# Patient Record
Sex: FEMALE | Race: WHITE
[De-identification: names, ages, dates, MRNs, and addresses within clinical notes are randomized per-mention and may not be internally consistent; named-entity substitution may affect disease eponyms.]

---

## 2023-08-11 ENCOUNTER — HOSPITAL ENCOUNTER (OUTPATIENT)
Dept: HOSPITAL 33 - ED | Age: 73
Setting detail: OBSERVATION
LOS: 1 days | Discharge: HOME | End: 2023-08-12
Attending: INTERNAL MEDICINE | Admitting: INTERNAL MEDICINE
Payer: MEDICARE

## 2023-08-11 DIAGNOSIS — E87.6: Primary | ICD-10-CM

## 2023-08-11 DIAGNOSIS — Z79.899: ICD-10-CM

## 2023-08-11 DIAGNOSIS — R53.1: ICD-10-CM

## 2023-08-11 DIAGNOSIS — I10: ICD-10-CM

## 2023-08-11 DIAGNOSIS — E78.5: ICD-10-CM

## 2023-08-11 DIAGNOSIS — Z20.828: ICD-10-CM

## 2023-08-11 LAB
ALBUMIN SERPL-MCNC: 4.3 G/DL (ref 3.5–5)
ALP SERPL-CCNC: 77 U/L (ref 38–126)
ALT SERPL-CCNC: 25 U/L (ref 0–35)
ANION GAP SERPL CALC-SCNC: 10.3 MEQ/L (ref 5–15)
AST SERPL QL: 37 U/L (ref 14–36)
BASOPHILS # BLD AUTO: 0.02 X10^3/UL (ref 0–0.4)
BASOPHILS NFR BLD AUTO: 0.3 % (ref 0–0.4)
BILIRUB BLD-MCNC: 0.8 MG/DL (ref 0.2–1.3)
BUN SERPL-MCNC: 19 MG/DL (ref 7–17)
CALCIUM SPEC-MCNC: 9.3 MG/DL (ref 8.4–10.2)
CHLORIDE SERPL-SCNC: 98 MMOL/L (ref 98–107)
CO2 SERPL-SCNC: 35 MMOL/L (ref 22–30)
CREAT SERPL-MCNC: 0.76 MG/DL (ref 0.52–1.04)
EOSINOPHIL # BLD AUTO: 0.23 X10^3/UL (ref 0–0.5)
GFR SERPLBLD BASED ON 1.73 SQ M-ARVRAT: > 60 ML/MIN
GLUCOSE SERPL-MCNC: 83 MG/DL (ref 74–106)
HCT VFR BLD AUTO: 40.2 % (ref 35–47)
HGB BLD-MCNC: 13.8 G/DL (ref 12–16)
IMM GRANULOCYTES # BLD: 0.02 X10^3U/L (ref 0–0.03)
IMM GRANULOCYTES NFR BLD: 0.3 % (ref 0–0.4)
LYMPHOCYTES # SPEC AUTO: 1.89 X10^3/UL (ref 1–4.6)
MCH RBC QN AUTO: 31.1 PG (ref 26–32)
MCHC RBC AUTO-ENTMCNC: 34.3 G/DL (ref 32–36)
MONOCYTES # BLD AUTO: 0.75 X10^3/UL (ref 0–1.3)
NRBC # BLD AUTO: 0 X10^3U/L (ref 0–0.01)
NRBC BLD AUTO-RTO: 0 % (ref 0–0.1)
PLATELET # BLD AUTO: 193 X10^3/UL (ref 150–450)
POTASSIUM SERPLBLD-SCNC: 2.6 MMOL/L (ref 3.5–5.1)
PROT SERPL-MCNC: 7.5 G/DL (ref 6.3–8.2)
RBC # BLD AUTO: 4.44 X10^6/UL (ref 4.1–5.4)
SODIUM SERPL-SCNC: 140 MMOL/L (ref 137–145)
WBC # BLD AUTO: 6.3 X10^3/UL (ref 4–10.5)

## 2023-08-11 PROCEDURE — 99284 EMERGENCY DEPT VISIT MOD MDM: CPT

## 2023-08-11 PROCEDURE — 85025 COMPLETE CBC W/AUTO DIFF WBC: CPT

## 2023-08-11 PROCEDURE — G0378 HOSPITAL OBSERVATION PER HR: HCPCS

## 2023-08-11 PROCEDURE — 84132 ASSAY OF SERUM POTASSIUM: CPT

## 2023-08-11 PROCEDURE — 36415 COLL VENOUS BLD VENIPUNCTURE: CPT

## 2023-08-11 PROCEDURE — 36000 PLACE NEEDLE IN VEIN: CPT

## 2023-08-11 PROCEDURE — 83735 ASSAY OF MAGNESIUM: CPT

## 2023-08-11 PROCEDURE — 80053 COMPREHEN METABOLIC PANEL: CPT

## 2023-08-11 PROCEDURE — 93268 ECG RECORD/REVIEW: CPT

## 2023-08-11 RX ADMIN — POTASSIUM CHLORIDE SCH MLS/HR: 14.9 INJECTION, SOLUTION INTRAVENOUS at 21:10

## 2023-08-11 RX ADMIN — POTASSIUM CHLORIDE SCH MLS/HR: 14.9 INJECTION, SOLUTION INTRAVENOUS at 23:20

## 2023-08-11 NOTE — ERPHSYRPT
- History of Present Illness


Time Seen by Provider: 08/11/23 20:59


Source: patient


Exam Limitations: no limitations


Patient Subjective Stated Complaint: Pt states "My dr had me get blood work 

today and my potassium was just over 2"


Triage Nursing Assessment: Pt presented alert and oriented X 3, skin pwd. Pt 

ambulates with an upright steady gait, able to speak in clear full sentences. PT

able to speka in clear full sentences. PT resting comfortalby on the bed.


Physician History: 


Patient is a 72-year-old female presents to our ED as a referral from her nurse 

practitioner for treatment of hypokalemia.  Patient is currently on a diuretic 

for blood pressure control.  Patient is not on a standard blood pressure medicat

ion due to allergy.  Patient states she feels weak.  No trauma.  No fever.  No 

nausea vomiting or diaphoresis.  Symptoms are constant.  Symptoms are moderate 

in intensity.  No specific worsening improving factors.  Patient voices no other

complaints or concerns at this time.








Portions of this note were created with voice recognition technology.  There may

be grammatical, spelling, punctuation or sound alike errors





Timing/Duration: today


Severity: moderate


Associated Symptoms: denies symptoms, weakness


Allergies/Adverse Reactions: 








diatrizoate sodium [From Hypaque] Allergy (Severe, Verified 08/11/23 18:23)


   anaphylaxis


doxepin Allergy (Severe, Verified 08/11/23 18:23)


   trouble breathing, headache


Penicillins Allergy (Severe, Verified 08/11/23 18:23)


   anaphylaxis


prochlorperazine [From Compazine] Allergy (Severe, Verified 08/11/23 18:23)


   anaphylaxis


pentobarbital [From Nembutal Sodium] Allergy (Unknown, Verified 08/11/23 18:23)


   


acrivastine [From Semprex-D] Adverse Reaction (Severe, Verified 08/11/23 18:23)


   chest pain


pseudoephedrine [From Semprex-D] Adverse Reaction (Severe, Verified 08/11/23 

18:23)


   chest pain


Bifidobacterium animalis [From Pamine FQ] Adverse Reaction (Intermediate, 

Verified 08/11/23 18:23)


   bloating and cramps


estrogens, conjugated [From Premarin] Adverse Reaction (Intermediate, Verified 

08/11/23 18:23)


   itching and pan


Lactobacillus acidophilus [From Pamine FQ] Adverse Reaction (Intermediate, 

Verified 08/11/23 18:23)


   bloating and cramps


Lactobacillus paracasei [From Pamine FQ] Adverse Reaction (Intermediate, 

Verified 08/11/23 18:23)


   bloating and cramps


meloxicam Adverse Reaction (Intermediate, Verified 08/11/23 18:23)


   cramping abdominal pain


nabumetone [From Relafen] Adverse Reaction (Intermediate, Verified 08/11/23 

18:23)


   esophageal and abdominal injury


omeprazole [From Prilosec] Adverse Reaction (Intermediate, Verified 08/11/23 

18:23)


   pain under left breast


scopolamine [From Pamine FQ] Adverse Reaction (Intermediate, Verified 08/11/23 

18:23)


   bloating and cramps


Streptococcus thermophilus [From Pamine FQ] Adverse Reaction (Intermediate, 

Verified 08/11/23 18:23)


   bloating and cramps


indomethacin [From Indocin] Adverse Reaction (Mild, Verified 08/11/23 18:23)


   nausea/vomiting


savilla Allergy (Intermediate, Uncoded 08/11/23 18:23)


   Hives


viactin Adverse Reaction (Severe, Uncoded 08/11/23 18:23)


   diarrhea/abdominal pain


shingles vaccine Adverse Reaction (Intermediate, Uncoded 08/11/23 18:23)


   headache, hot, itchy, pain, welts





Home Medications: 








Amitriptyline HCl 10 mg PO DAILY 08/11/23 [History]


Cyclobenzaprine HCl 10 mg*** [Cyclobenzaprine 10 MG***] 10 mg PO TID 08/11/23 

[History]


Diclofenac Sodium [Voltaren Arthritis Pain] 50 gm TP BID 08/11/23 [History]


Famotidine/Ca Carb/Mag Hydrox [Pepcid Complete Tablet Chew] 1 each PO DAILY 

08/11/23 [History]


Gabapentin 600 mg PO QID 08/11/23 [History]


Metronidazole Vag Gel*** [METROGEL Vag Gel 0.75%***] 70 gm VG DAILY PRN 08/11/23

[History]


Pantoprazole Sodium 40 mg PO DAILY 08/11/23 [History]


Polyethylene Glycol 3350 [Miralax] 17 gm PO DAILY 08/11/23 [History]


Zonisamide 50 mg PO BID 08/11/23 [History]


hydroCHLOROthiazide [Hydrochlorothiazide] 25 mg PO DAILY 08/11/23 [History]





Hx Tetanus, Diphtheria Vaccination/Date Given: No


Hx Influenza Vaccination/Date Given: Yes


Hx Pneumococcal Vaccination/Date Given: No


Immunizations Up to Date: No





Travel Risk





- International Travel


Have you traveled outside of the country in past 3 weeks: No





- Coronavirus Screening


Are you exhibiting any of the following symptoms?: No


Close contact with a COVID-19 positive Pt in past 14-21 Days: No





- Vaccine Status


Have you recieved a Covid-19 vaccination: Yes


: Moderna





- Vaccination Dates


Date of 2cond Vaccination (if applicable): 2021





- Review of Systems


Constitutional: No Symptoms, No Fever, No Chills


Eyes: No Symptoms


Ears, Nose, & Throat: No Symptoms


Respiratory: No Symptoms, No Cough, No Dyspnea


Cardiac: No Symptoms, No Chest Pain, No Edema, No Syncope


Abdominal/Gastrointestinal: No Symptoms, No Abdominal Pain, No Nausea, No 

Vomiting, No Diarrhea


Genitourinary Symptoms: No Symptoms, No Dysuria


Musculoskeletal: No Symptoms, No Back Pain, No Neck Pain


Skin: No Symptoms, No Rash


Neurological: No Symptoms, No Dizziness, No Focal Weakness, No Sensory Changes


Psychological: No Symptoms


Endocrine: No Symptoms


Hematologic/Lymphatic: No Symptoms


Immunological/Allergic: No Symptoms


All Other Systems: Reviewed and Negative





- Past Medical History


Pertinent Past Medical History: Yes


Neurological History: Migraines, Seizures, TIA


ENT History: Cataracts


Cardiac History: High Cholesterol, Hypertension


Respiratory History: No Pertinent History


Endocrine Medical History: Other


Musculoskeletal History: Degenerative Disk Disease, Fibromyalgia, Other


GI Medical History: Gallbladder Disease, Other


 History: Other


Psycho-Social History: No Pertinent History


Female Reproductive Disorders: Other


Other Medical History: renal cyst.  thyroid cyst.  bladder trouble.  endometrial

 hyperplasia.  bulging disc





- Past Surgical History


Past Surgical History: Yes


Cardiac: No Pertinent History


Respiratory: No Pertinent History


Gastrointestinal: Cholecystectomy


Genitourinary: No Pertinent History


Musculoskeletal: No Pertinent History


Female Surgical History: Tubal Ligation





- Social History


Smoking Status: Never smoker


Exposure to second hand smoke: No


Drug Use: none


Patient Lives Alone: No





- Nursing Vital Signs


Nursing Vital Signs: 





                               Initial Vital Signs











Temperature  97.2 F   08/11/23 18:03


 


Pulse Rate  96 H  08/11/23 18:03


 


Respiratory Rate  20   08/11/23 18:03


 


Blood Pressure  182/80   08/11/23 18:03


 


O2 Sat by Pulse Oximetry  95   08/11/23 18:03








                                   Pain Scale











Pain Intensity                 2

















- Physical Exam


General Appearance: no apparent distress, alert


Eye Exam: PERRL/EOMI, eyes nml inspection


Ears, Nose, Throat Exam: normal ENT inspection, TMs normal, pharynx normal, 

moist mucous membranes


Neck Exam: normal inspection, non-tender, supple, full range of motion


Respiratory Exam: normal breath sounds, lungs clear, airway intact, No 

respiratory distress


Cardiovascular Exam: regular rate/rhythm, normal heart sounds, normal peripheral

 pulses


Gastrointestinal/Abdomen Exam: soft, normal bowel sounds, No tenderness, No mass


Back Exam: normal inspection, normal range of motion, No CVA tenderness, No 

vertebral tenderness


Extremity Exam: normal inspection, normal range of motion, pelvis stable


Neurologic Exam: alert, oriented x 3, cooperative, normal mood/affect, nml 

cerebellar function, nml station & gait, sensation nml, No motor deficits


Skin Exam: normal color, warm, dry, No rash


Lymphatic Exam: No adenopathy


**SpO2 Interpretation**: normal


SpO2: 96


O2 Delivery: Room Air





- Course


Nursing assessment & vital signs reviewed: Yes


EKG Interpreted by Me: RATE ([id number]), Sinus Rhythm, NORMAL AXIS, NORMAL INTERVALS


Ordered Tests: 





                               Active Orders 24 hr











 Category Date Time Status


 


 Telemetry q4h Care  08/11/23 20:49 Active


 


 CBC W DIFF Stat Lab  08/11/23 20:20 Completed


 


 CMP Stat Lab  08/11/23 20:20 Completed


 


 MAGNESIUM Stat Lab  08/11/23 20:50 Received








Medication Summary











Generic Name Dose Route Start Last Admin





  Trade Name Freq  PRN Reason Stop Dose Admin


 


Magnesium Sulfate/Water  2 gm in 50 mls @ 100 mls/hr  08/11/23 21:00 





  Magnesium Sulf 2 G/50 Ml Bag  IV  08/12/23 21:29 





  Q30MIN EDILMA  


 


Potassium Chloride  20 meq in 100 mls @ 50 mls/hr  08/11/23 21:00 





  Potassium Chloride 20 Meq In Water 100ml  IV  08/12/23 00:59 





  Q2H EDILMA  














Discontinued Medications














Generic Name Dose Route Start Last Admin





  Trade Name Freq  PRN Reason Stop Dose Admin


 


Potassium Chloride  40 meq  08/11/23 20:49 





  Potassium Chloride Tab 10 Meq Tab  PO  08/11/23 20:50 





  STAT ONE  











Lab/Rad Data: 





                           Laboratory Result Diagrams





                                 08/11/23 20:20 





                                 08/11/23 20:20 





                               Laboratory Results











  08/11/23 08/11/23 Range/Units





  20:20 20:20 


 


WBC   6.3  (4.0-10.5)  x10^3/uL


 


RBC   4.44  (4.1-5.4)  x10^6/uL


 


Hgb   13.8  (12.0-16.0)  g/dL


 


Hct   40.2  (35-47)  %


 


MCV   90.5  ()  fL


 


MCH   31.1  (26-32)  pg


 


MCHC   34.3  (32-36)  g/dL


 


RDW   12.5  (11.5-14.0)  %


 


Plt Count   193  (150-450)  x10^3/uL


 


MPV   9.4  (7.5-11.0)  fL


 


Gran %   53.6  (36.0-66.0)  %


 


Immature Gran % (Auto)   0.3  (0.00-0.4)  %


 


Nucleat RBC Rel Count   0.0  (0.00-0.1)  %


 


Eos # (Auto)   0.23  (0-0.5)  x10^3/uL


 


Immature Gran # (Auto)   0.02  (0.00-0.03)  x10^3u/L


 


Absolute Lymphs (auto)   1.89  (1.0-4.6)  x10^3/uL


 


Absolute Monos (auto)   0.75  (0.0-1.3)  x10^3/uL


 


Absolute Nucleated RBC   0.00  (0.00-0.01)  x10^3u/L


 


Lymphocytes %   30.1  (24.0-44.0)  %


 


Monocytes %   12.0  (0.0-12.0)  %


 


Eosinophils %   3.7  (0.00-5.0)  %


 


Basophils %   0.3  (0.0-0.4)  %


 


Absolute Granulocytes   3.36  (1.4-6.9)  x10^3/uL


 


Basophils #   0.02  (0-0.4)  x10^3/uL


 


Sodium  140   (137-145)  mmol/L


 


Potassium  2.6 L*   (3.5-5.1)  mmol/L


 


Chloride  98   ()  mmol/L


 


Carbon Dioxide  35 H   (22-30)  mmol/L


 


Anion Gap  10.3   (5-15)  MEQ/L


 


BUN  19 H   (7-17)  mg/dL


 


Creatinine  0.76   (0.52-1.04)  mg/dL


 


Estimated GFR  > 60.0   ML/MIN


 


Glucose  83   ()  mg/dL


 


Calcium  9.3   (8.4-10.2)  mg/dL


 


Total Bilirubin  0.80   (0.2-1.3)  mg/dL


 


AST  37 H   (14-36)  U/L


 


ALT  25   (0-35)  U/L


 


Alkaline Phosphatase  77   ()  U/L


 


Serum Total Protein  7.5   (6.3-8.2)  g/dL


 


Albumin  4.3   (3.5-5.0)  g/dL














- Progress


Progress: improved


Progress Note: 


Patient 72-year-old female presents emergency department for as a referral for 

treatment for hypokalemia.  Potassium currently 2.6.  Patient states she has 

been feeling weak.  Physical exam reveals a well-appearing female.  No distress.

   at bedside.  Patient will be admitted for potassium placement.  Orders

 placed for 40 mill equivalent potassium chloride, 40 mEq K rider and 2 g 

magnesium.  Plan of care discussed with patient.  She agrees to admission to 

[facility] for further evaluation and treatment.








Portions of this note were created with voice recognition technology.  There may

 be grammatical, spelling, punctuation or sound alike errors








Complexity of problem addressed is moderate.





COPA (number of complexity of problem addressed)


Minimal, Straight forward, one self limited or minor problem


Low. Acute uncomplicated stable +/- admission,     Any acute or chronic illness,

   2 or more self-limited or minor problems.  


Moderate.  Acute, complicated or with systemic illness.   Chronic illness with 

exacerbation,  New diagnosis with uncertain prognosis. 2 or more chronic stable 

illnesses.  


High.  Any severe exacerbation or threat to bodily function, Any treatment side 

effects








No critical care time.





Complexity data reviewed and analyzed is extensive.  Test ordered.  Test 

reviewed.  Clinical correlation made between findings and history and physical 

examination.  Case/management discussed with Dr. Biggs at 9:03 PM.  Dr. Biggs 

excepts admission to observation.











Risk of complication and or risk morbidity/mortality of patient management is 

high.  Patient will require IV potassium which requires telemetry monitoring.  

Patient will require hospitalization for further evaluation and treatment of 

hypokalemia





Vital stable.  Diagnosis hypokalemia, generalized weakness.  Plan of care 

established for shared decision making.  Patient agrees to admission.  She 

voices no other complaints or concerns at this time.








Portions of this note were created with voice recognition technology.  There may

 be grammatical, spelling, punctuation or sound alike errors


08/11/23 21:07





08/11/23 21:11





Counseled pt/family regarding: lab results, diagnosis





- Departure


Departure Disposition: Observation


Clinical Impression: 


 Hypokalemia, Generalized weakness





Condition: Stable


Critical Care Time: No


Referrals: 


ADRY LORA FNP [Primary Care Provider] - Follow up/PCP as directed

## 2023-08-12 VITALS
HEART RATE: 61 BPM | DIASTOLIC BLOOD PRESSURE: 64 MMHG | OXYGEN SATURATION: 95 % | SYSTOLIC BLOOD PRESSURE: 135 MMHG | RESPIRATION RATE: 16 BRPM | TEMPERATURE: 98 F

## 2023-08-12 LAB
ALBUMIN SERPL-MCNC: 3.7 G/DL (ref 3.5–5)
ALP SERPL-CCNC: 80 U/L (ref 38–126)
ALT SERPL-CCNC: 21 U/L (ref 0–35)
ANION GAP SERPL CALC-SCNC: 9.9 MEQ/L (ref 5–15)
AST SERPL QL: 31 U/L (ref 14–36)
BASOPHILS # BLD AUTO: 0.03 X10^3/UL (ref 0–0.4)
BASOPHILS NFR BLD AUTO: 0.5 % (ref 0–0.4)
BILIRUB BLD-MCNC: 0.7 MG/DL (ref 0.2–1.3)
BUN SERPL-MCNC: 13 MG/DL (ref 7–17)
CALCIUM SPEC-MCNC: 8.7 MG/DL (ref 8.4–10.2)
CHLORIDE SERPL-SCNC: 106 MMOL/L (ref 98–107)
CO2 SERPL-SCNC: 26 MMOL/L (ref 22–30)
CREAT SERPL-MCNC: 0.65 MG/DL (ref 0.52–1.04)
EOSINOPHIL # BLD AUTO: 0.25 X10^3/UL (ref 0–0.5)
GFR SERPLBLD BASED ON 1.73 SQ M-ARVRAT: > 60 ML/MIN
GLUCOSE SERPL-MCNC: 113 MG/DL (ref 74–106)
HCT VFR BLD AUTO: 37.9 % (ref 35–47)
HGB BLD-MCNC: 13.3 G/DL (ref 12–16)
IMM GRANULOCYTES # BLD: 0.01 X10^3U/L (ref 0–0.03)
IMM GRANULOCYTES NFR BLD: 0.2 % (ref 0–0.4)
LYMPHOCYTES # SPEC AUTO: 1.93 X10^3/UL (ref 1–4.6)
MCH RBC QN AUTO: 31.1 PG (ref 26–32)
MCHC RBC AUTO-ENTMCNC: 35.1 G/DL (ref 32–36)
MONOCYTES # BLD AUTO: 0.71 X10^3/UL (ref 0–1.3)
NRBC # BLD AUTO: 0 X10^3U/L (ref 0–0.01)
NRBC BLD AUTO-RTO: 0 % (ref 0–0.1)
PLATELET # BLD AUTO: 182 X10^3/UL (ref 150–450)
POTASSIUM SERPLBLD-SCNC: 3.4 MMOL/L (ref 3.5–5.1)
PROT SERPL-MCNC: 6.4 G/DL (ref 6.3–8.2)
RBC # BLD AUTO: 4.27 X10^6/UL (ref 4.1–5.4)
SODIUM SERPL-SCNC: 139 MMOL/L (ref 137–145)
WBC # BLD AUTO: 5.8 X10^3/UL (ref 4–10.5)

## 2023-08-12 RX ADMIN — GABAPENTIN SCH MG: 300 CAPSULE ORAL at 10:30

## 2023-08-12 RX ADMIN — GABAPENTIN SCH MG: 300 CAPSULE ORAL at 14:29

## 2023-08-12 RX ADMIN — POTASSIUM CHLORIDE SCH MLS/HR: 14.9 INJECTION, SOLUTION INTRAVENOUS at 03:13

## 2023-08-12 RX ADMIN — POTASSIUM CHLORIDE SCH MLS/HR: 14.9 INJECTION, SOLUTION INTRAVENOUS at 10:31

## 2023-08-12 NOTE — PCM.HP
History of Present Illness





- Chief Complaint


Chief Complaint: hypokalemia, generalized weakness


Date: 08/12/23





Medications & Allergies


Home Medications: 


                              Home Medication List





Amitriptyline HCl 10 mg PO DAILY PRN 08/11/23 [History Confirmed 08/11/23]


Cyclobenzaprine HCl 10 mg*** [Cyclobenzaprine 10 MG***] 10 mg PO TID PRN 

08/11/23 [History Confirmed 08/11/23]


Diclofenac Sodium [Voltaren Arthritis Pain] 50 gm TP BID 08/11/23 [History 

Confirmed 08/11/23]


Famotidine/Ca Carb/Mag Hydrox [Pepcid Complete Tablet Chew] 1 each PO DAILY 

08/11/23 [History Confirmed 08/11/23]


Gabapentin 600 mg PO QID 08/11/23 [History Confirmed 08/11/23]


Polyethylene Glycol 3350 [Miralax] 17 gm PO DAILY 08/11/23 [History Confirmed 

08/11/23]


Zonisamide 50 mg PO BID 08/11/23 [History Confirmed 08/11/23]


hydroCHLOROthiazide [Hydrochlorothiazide] 25 mg PO DAILY 08/11/23 [History 

Confirmed 08/11/23]








Allergies/Adverse Reactions: 


                                    Allergies











Allergy/AdvReac Type Severity Reaction Status Date / Time


 


diatrizoate sodium Allergy Severe anaphylaxis Verified 08/11/23 18:23





[From Hypaque]     


 


doxepin Allergy Severe trouble Verified 08/11/23 18:23





   breathing,  





   headache  


 


Penicillins Allergy Severe anaphylaxis Verified 08/11/23 18:23


 


prochlorperazine Allergy Severe anaphylaxis Verified 08/11/23 18:23





[From Compazine]     


 


pentobarbital Allergy Unknown  Verified 08/11/23 18:23





[From Nembutal Sodium]     


 


acrivastine [From Semprex-D] AdvReac Severe chest pain Verified 08/11/23 18:23


 


pseudoephedrine AdvReac Severe chest pain Verified 08/11/23 18:23





[From Semprex-D]     


 


Bifidobacterium animalis AdvReac Intermediate bloating Verified 08/11/23 18:23





[From Pamine FQ]   and cramps  


 


estrogens, conjugated AdvReac Intermediate itching Verified 08/11/23 18:23





[From Premarin]   and pan  


 


Lactobacillus acidophilus AdvReac Intermediate bloating Verified 08/11/23 18:23





[From Pamine FQ]   and cramps  


 


Lactobacillus paracasei AdvReac Intermediate bloating Verified 08/11/23 18:23





[From Pamine FQ]   and cramps  


 


meloxicam AdvReac Intermediate cramping Verified 08/11/23 18:23





   abdominal  





   pain  


 


nabumetone [From Relafen] AdvReac Intermediate esophageal Verified 08/11/23 

18:23





   and  





   abdominal  





   injury  


 


omeprazole [From Prilosec] AdvReac Intermediate pain under Verified 08/11/23 

18:23





   left breast  


 


scopolamine [From Pamine FQ] AdvReac Intermediate bloating Verified 08/11/23 18:

23





   and cramps  


 


Streptococcus thermophilus AdvReac Intermediate bloating Verified 08/11/23 18:23





[From Pamine FQ]   and cramps  


 


indomethacin [From Indocin] AdvReac Mild nausea/vomi Verified 08/11/23 18:23





   ting  


 


savilla Allergy Intermediate Hives Uncoded 08/11/23 18:23


 


viactin AdvReac Severe diarrhea/abdominal Uncoded 08/11/23 18:23





   pain  


 


shingles vaccine AdvReac Intermediate headache, Uncoded 08/11/23 18:23





   hot,  





   itchy,  





   pain, welts  














- Past Medical History


Past Medical History: Yes


Neurological History: Migraines, Seizures, TIA


ENT History: Cataracts


Cardiac History: High Cholesterol, Hypertension


Respiratory History: No Pertinent History


Endocrine Medical History: Other


Musculoskelatal History: Degenerative Disk Disease, Fibromyalgia, Other


GI Medical History: Gallbladder Disease, Irritable Bowel, Other


 History: Other


Pyscho-Social History: No Pertinent History


Reproductive Disorders: Other


Comment: renal cyst.  thyroid cyst.  bladder trouble.  endometrial hyperplasia. 

bulging disc.  IBS





- Female History


Are you pregnant now?: No





- Past Surgical History


Past Surgical History: Yes


Neuro Surgical History: No Pertinent History


Cardiac History: No Pertinent History


Respiratory Surgery: No Pertinent History


GI Surgical History: Cholecystectomy


Genitourinary Surgical Hx: No Pertinent History


Musculskeletal Surgical Hx: No Pertinent History


Female Surgical History: Tubal Ligation





- Social History


Smoking Status: Never smoker


Exposure to second hand smoke: No


Alcohol: None


Drug Use: none





- Physical Exam


Vital Signs: 


                               Vital Signs - 24 hr











  Temp Pulse Resp BP BP Pulse Ox


 


 08/12/23 00:00    20   


 


 08/11/23 23:45  97.2 F  71  18   151/67  95


 


 08/11/23 21:13       96


 


 08/11/23 21:00   71  13  148/72   96


 


 08/11/23 20:49  97.1 F  71  18   168/79  94 L


 


 08/11/23 20:30   74  10 L  139/68   97


 


 08/11/23 20:00   72  21  143/67   96


 


 08/11/23 19:30   89  18  146/62   96


 


 08/11/23 18:03  97.2 F  96 H  20   182/80  95














Results





- Labs


Lab/Micro Results: 


                            Lab Results-Last 24 Hours











  08/11/23 08/11/23 08/11/23 Range/Units





  20:20 20:20 20:50 


 


WBC  6.3    (4.0-10.5)  x10^3/uL


 


RBC  4.44    (4.1-5.4)  x10^6/uL


 


Hgb  13.8    (12.0-16.0)  g/dL


 


Hct  40.2    (35-47)  %


 


MCV  90.5    ()  fL


 


MCH  31.1    (26-32)  pg


 


MCHC  34.3    (32-36)  g/dL


 


RDW  12.5    (11.5-14.0)  %


 


Plt Count  193    (150-450)  x10^3/uL


 


MPV  9.4    (7.5-11.0)  fL


 


Gran %  53.6    (36.0-66.0)  %


 


Immature Gran % (Auto)  0.3    (0.00-0.4)  %


 


Nucleat RBC Rel Count  0.0    (0.00-0.1)  %


 


Eos # (Auto)  0.23    (0-0.5)  x10^3/uL


 


Immature Gran # (Auto)  0.02    (0.00-0.03)  x10^3u/L


 


Absolute Lymphs (auto)  1.89    (1.0-4.6)  x10^3/uL


 


Absolute Monos (auto)  0.75    (0.0-1.3)  x10^3/uL


 


Absolute Nucleated RBC  0.00    (0.00-0.01)  x10^3u/L


 


Lymphocytes %  30.1    (24.0-44.0)  %


 


Monocytes %  12.0    (0.0-12.0)  %


 


Eosinophils %  3.7    (0.00-5.0)  %


 


Basophils %  0.3    (0.0-0.4)  %


 


Absolute Granulocytes  3.36    (1.4-6.9)  x10^3/uL


 


Basophils #  0.02    (0-0.4)  x10^3/uL


 


Sodium   140   (137-145)  mmol/L


 


Potassium   2.6 L*   (3.5-5.1)  mmol/L


 


Chloride   98   ()  mmol/L


 


Carbon Dioxide   35 H   (22-30)  mmol/L


 


Anion Gap   10.3   (5-15)  MEQ/L


 


BUN   19 H   (7-17)  mg/dL


 


Creatinine   0.76   (0.52-1.04)  mg/dL


 


Estimated GFR   > 60.0   ML/MIN


 


Glucose   83   ()  mg/dL


 


Calcium   9.3   (8.4-10.2)  mg/dL


 


Magnesium    2.3  (1.6-2.3)  mg/dL


 


Total Bilirubin   0.80   (0.2-1.3)  mg/dL


 


AST   37 H   (14-36)  U/L


 


ALT   25   (0-35)  U/L


 


Alkaline Phosphatase   77   ()  U/L


 


Serum Total Protein   7.5   (6.3-8.2)  g/dL


 


Albumin   4.3   (3.5-5.0)  g/dL














Assessment/Plan


(1) Hypokalemia


Current Visit: Yes   Status: Acute   


Assessment & Plan: 





ASSESSMENT


1. Hypokalemia


2. Hypertension


3. Hyperlipidemia





PLAN


1. Replete K - received 40 PO/40 IV in ED; repeat K now


2. Resume all homed medications except of HCTZ


3. She will need an alternative to her HCTZ for BP management upon discharge





Lovenox/PPI





The entirety of this encounter was done via telemedicine





Alvin Biggs MD


Pulmonary and Critical Care Medicine


Code(s): E87.6 - HYPOKALEMIA   





Telemedicine Encounter





- Telemedicine Encounter


Telemedicine Encounter: 


The entirety of this encounter was performed via Telemedicine"

## 2023-08-12 NOTE — PCM.DS
Discharge Summary


Date of Admission: 


08/11/23 21:31





Date of Discharge: 





8/12/23


Admitting Physician: 


JONO DESHPANDE MD





Primary Care Provider: 


PEREZ ANDERSNO








Allergies


Allergies





diatrizoate sodium [From Hypaque] Allergy (Severe, Verified 08/11/23 18:23)


   anaphylaxis


doxepin Allergy (Severe, Verified 08/11/23 18:23)


   trouble breathing, headache


Penicillins Allergy (Severe, Verified 08/11/23 18:23)


   anaphylaxis


prochlorperazine [From Compazine] Allergy (Severe, Verified 08/11/23 18:23)


   anaphylaxis


pentobarbital [From Nembutal Sodium] Allergy (Unknown, Verified 08/11/23 18:23)


   


acrivastine [From Semprex-D] Adverse Reaction (Severe, Verified 08/11/23 18:23)


   chest pain


pseudoephedrine [From Semprex-D] Adverse Reaction (Severe, Verified 08/11/23 

18:23)


   chest pain


Bifidobacterium animalis [From Pamine FQ] Adverse Reaction (Intermediate, 

Verified 08/11/23 18:23)


   bloating and cramps


estrogens, conjugated [From Premarin] Adverse Reaction (Intermediate, Verified 

08/11/23 18:23)


   itching and pan


Lactobacillus acidophilus [From Pamine FQ] Adverse Reaction (Intermediate, 

Verified 08/11/23 18:23)


   bloating and cramps


Lactobacillus paracasei [From Pamine FQ] Adverse Reaction (Intermediate, 

Verified 08/11/23 18:23)


   bloating and cramps


meloxicam Adverse Reaction (Intermediate, Verified 08/11/23 18:23)


   cramping abdominal pain


nabumetone [From Relafen] Adverse Reaction (Intermediate, Verified 08/11/23 

18:23)


   esophageal and abdominal injury


omeprazole [From Prilosec] Adverse Reaction (Intermediate, Verified 08/11/23 

18:23)


   pain under left breast


scopolamine [From Pamine FQ] Adverse Reaction (Intermediate, Verified 08/11/23 

18:23)


   bloating and cramps


Streptococcus thermophilus [From Pamine FQ] Adverse Reaction (Intermediate, 

Verified 08/11/23 18:23)


   bloating and cramps


indomethacin [From Indocin] Adverse Reaction (Mild, Verified 08/11/23 18:23)


   nausea/vomiting


savilla Allergy (Intermediate, Uncoded 08/11/23 18:23)


   Hives


viactin Adverse Reaction (Severe, Uncoded 08/11/23 18:23)


   diarrhea/abdominal pain


shingles vaccine Adverse Reaction (Intermediate, Uncoded 08/11/23 18:23)


   headache, hot, itchy, pain, [facility] Summary





- Hospital Course


Hospital Course: 





Mrs. Hawkins is a 72-year-old female with a pmxh of HTN, Migraines, seizures, HLD,

DDD, and fibromyalgia admitted for treatment of hypokalemia most likely 

resultant from her HCTZ.  Patient responded well to potassium chloride infusions

and will discharge home on potassium 40 meq daily x 3 days. Advised close follow

up with pcp in 3 days for re-evaluation, hold HCTZ until follow up, bmp prior. 





All appropriate labs, diagnostic procedures, consultations, and treatments 

ordered for the above diagnoses.


All labs,EKG, imaging and chart reviewed by me. I saw the images also.


All labs and imaging discussed with the patient/family. 


I also discussed with nurse, , pharmacist and consultants involved 

in patient 's care.I spent 35 minutes for planning and coordinating safe 

discharge of this patient including exam, discussing hospital stay and discharge

instructions with patient & caregivers, preparation of discharge records, 

prescriptions & referral forms and addressing any questions/concerns the patient

had as documented above.





- Vitals & Intake/Output


Vital Signs: 





                                   Vital Signs











Temperature  97.5 F   08/12/23 06:58


 


Pulse Rate  60   08/12/23 06:58


 


Respiratory Rate  15   08/12/23 06:58


 


Blood Pressure  146/67   08/12/23 06:58


 


O2 Sat by Pulse Oximetry  95   08/12/23 06:58











Intake & Output: 





                                 Intake & Output











 08/10/23 08/11/23 08/12/23 08/13/23





 11:59 11:59 11:59 11:59


 


Intake Total   1336 


 


Output Total   750 


 


Balance   586 


 


Weight   75.5 kg 














- Lab


Result Diagrams: 


                                 08/12/23 05:13





                                 08/12/23 16:00


Lab Results-Last 24 Hrs: 





                            Lab Results-Last 24 Hours











  08/11/23 08/11/23 08/11/23 Range/Units





  20:20 20:20 20:50 


 


WBC  6.3    (4.0-10.5)  x10^3/uL


 


RBC  4.44    (4.1-5.4)  x10^6/uL


 


Hgb  13.8    (12.0-16.0)  g/dL


 


Hct  40.2    (35-47)  %


 


MCV  90.5    ()  fL


 


MCH  31.1    (26-32)  pg


 


MCHC  34.3    (32-36)  g/dL


 


RDW  12.5    (11.5-14.0)  %


 


Plt Count  193    (150-450)  x10^3/uL


 


MPV  9.4    (7.5-11.0)  fL


 


Gran %  53.6    (36.0-66.0)  %


 


Immature Gran % (Auto)  0.3    (0.00-0.4)  %


 


Nucleat RBC Rel Count  0.0    (0.00-0.1)  %


 


Eos # (Auto)  0.23    (0-0.5)  x10^3/uL


 


Immature Gran # (Auto)  0.02    (0.00-0.03)  x10^3u/L


 


Absolute Lymphs (auto)  1.89    (1.0-4.6)  x10^3/uL


 


Absolute Monos (auto)  0.75    (0.0-1.3)  x10^3/uL


 


Absolute Nucleated RBC  0.00    (0.00-0.01)  x10^3u/L


 


Lymphocytes %  30.1    (24.0-44.0)  %


 


Monocytes %  12.0    (0.0-12.0)  %


 


Eosinophils %  3.7    (0.00-5.0)  %


 


Basophils %  0.3    (0.0-0.4)  %


 


Absolute Granulocytes  3.36    (1.4-6.9)  x10^3/uL


 


Basophils #  0.02    (0-0.4)  x10^3/uL


 


Sodium   140   (137-145)  mmol/L


 


Potassium   2.6 L*   (3.5-5.1)  mmol/L


 


Chloride   98   ()  mmol/L


 


Carbon Dioxide   35 H   (22-30)  mmol/L


 


Anion Gap   10.3   (5-15)  MEQ/L


 


BUN   19 H   (7-17)  mg/dL


 


Creatinine   0.76   (0.52-1.04)  mg/dL


 


Estimated GFR   > 60.0   ML/MIN


 


Glucose   83   ()  mg/dL


 


Calcium   9.3   (8.4-10.2)  mg/dL


 


Magnesium    2.3  (1.6-2.3)  mg/dL


 


Total Bilirubin   0.80   (0.2-1.3)  mg/dL


 


AST   37 H   (14-36)  U/L


 


ALT   25   (0-35)  U/L


 


Alkaline Phosphatase   77   ()  U/L


 


Serum Total Protein   7.5   (6.3-8.2)  g/dL


 


Albumin   4.3   (3.5-5.0)  g/dL














  08/12/23 08/12/23 Range/Units





  02:24 05:13 


 


WBC   5.8  (4.0-10.5)  x10^3/uL


 


RBC   4.27  (4.1-5.4)  x10^6/uL


 


Hgb   13.3  (12.0-16.0)  g/dL


 


Hct   37.9  (35-47)  %


 


MCV   88.8  ()  fL


 


MCH   31.1  (26-32)  pg


 


MCHC   35.1  (32-36)  g/dL


 


RDW   12.8  (11.5-14.0)  %


 


Plt Count   182  (150-450)  x10^3/uL


 


MPV   9.7  (7.5-11.0)  fL


 


Gran %   49.6  (36.0-66.0)  %


 


Immature Gran % (Auto)   0.2  (0.00-0.4)  %


 


Nucleat RBC Rel Count   0.0  (0.00-0.1)  %


 


Eos # (Auto)   0.25  (0-0.5)  x10^3/uL


 


Immature Gran # (Auto)   0.01  (0.00-0.03)  x10^3u/L


 


Absolute Lymphs (auto)   1.93  (1.0-4.6)  x10^3/uL


 


Absolute Monos (auto)   0.71  (0.0-1.3)  x10^3/uL


 


Absolute Nucleated RBC   0.00  (0.00-0.01)  x10^3u/L


 


Lymphocytes %   33.2  (24.0-44.0)  %


 


Monocytes %   12.2 H  (0.0-12.0)  %


 


Eosinophils %   4.3  (0.00-5.0)  %


 


Basophils %   0.5  (0.0-0.4)  %


 


Absolute Granulocytes   2.88  (1.4-6.9)  x10^3/uL


 


Basophils #   0.03  (0-0.4)  x10^3/uL


 


Sodium    (137-145)  mmol/L


 


Potassium  3.1 L   (3.5-5.1)  mmol/L


 


Chloride    ()  mmol/L


 


Carbon Dioxide    (22-30)  mmol/L


 


Anion Gap    (5-15)  MEQ/L


 


BUN    (7-17)  mg/dL


 


Creatinine    (0.52-1.04)  mg/dL


 


Estimated GFR    ML/MIN


 


Glucose    ()  mg/dL


 


Calcium    (8.4-10.2)  mg/dL


 


Magnesium    (1.6-2.3)  mg/dL


 


Total Bilirubin    (0.2-1.3)  mg/dL


 


AST    (14-36)  U/L


 


ALT    (0-35)  U/L


 


Alkaline Phosphatase    ()  U/L


 


Serum Total Protein    (6.3-8.2)  g/dL


 


Albumin    (3.5-5.0)  g/dL














Discharge Exam


General Appearance: no apparent distress


Neurologic Exam: alert, oriented x 3


Eye Exam: PERRL


Ears, Nose, Throat Exam: normal ENT inspection


Respiratory Exam: normal breath sounds, lungs clear


Cardiovascular Exam: regular rate/rhythm, normal heart sounds


Gastrointestinal/Abdomen Exam: soft, normal bowel sounds


Rectal Exam: deferred


Extremity Exam: normal inspection


Skin Exam: pale





Final Diagnosis/Problem List





- Final Discharge Diagnosis/Problem


(1) Generalized weakness


Current Visit: Yes   Status: Acute   


Assessment & Plan: 


-Secondary to hypokalemia, improved


Code(s): R53.1 - WEAKNESS   





(2) Hypokalemia


Current Visit: Yes   Status: Acute   


Assessment & Plan: 


-Replenished, discharge following potassium infusion with follow up stable 

potassium levels, will send home on oral supplementation of potassium 40 meq 

daily x 3 days, advised close follow up with PCP in 3 days with [facility] prior


Code(s): E87.6 - HYPOKALEMIA   





Telemedicine Encounter





- Telemedicine Encounter


Telemedicine Encounter: 


The entirety of this encounter was performed via Telemedicine" 








- Discharge


Disposition: Home, Self-Care


Condition: Stable


Prescriptions: 


New


   Potassium Chloride 40 meq PO DAILY 3 Days #6 tablet





Continue


   Amitriptyline HCl 10 mg PO DAILY PRN


     PRN Reason: Insomnia


   Famotidine/Ca Carb/Mag Hydrox [Pepcid Complete Tablet Chew] 1 each PO DAILY


   Polyethylene Glycol 3350 [Miralax] 17 gm PO DAILY


   Zonisamide 50 mg PO BID


   Gabapentin 600 mg PO QID


   Cyclobenzaprine HCl 10 mg*** [Cyclobenzaprine 10 MG***] 10 mg PO TID PRN


     PRN Reason: Muscle Spasms





Discontinued


   hydroCHLOROthiazide [Hydrochlorothiazide] 25 mg PO DAILY


Outpatient Orders: 


BMP Time Frame: 3 Days, Facility: [facility], Location: 

LABORATORY


Additional Instructions: 


Follow up with PCP in three days with lab work prior


Follow up with: 


ADRY LORA FNP [Primary Care Provider] - Call for Appointment (in three days)